# Patient Record
Sex: MALE | Race: WHITE | NOT HISPANIC OR LATINO
[De-identification: names, ages, dates, MRNs, and addresses within clinical notes are randomized per-mention and may not be internally consistent; named-entity substitution may affect disease eponyms.]

---

## 2019-03-06 ENCOUNTER — APPOINTMENT (OUTPATIENT)
Dept: FAMILY MEDICINE | Facility: CLINIC | Age: 54
End: 2019-03-06

## 2019-10-11 ENCOUNTER — EMERGENCY (EMERGENCY)
Facility: HOSPITAL | Age: 54
LOS: 1 days | Discharge: HOME | End: 2019-10-11
Attending: INTERNAL MEDICINE | Admitting: INTERNAL MEDICINE
Payer: COMMERCIAL

## 2019-10-11 VITALS
SYSTOLIC BLOOD PRESSURE: 147 MMHG | HEART RATE: 67 BPM | RESPIRATION RATE: 15 BRPM | DIASTOLIC BLOOD PRESSURE: 89 MMHG | WEIGHT: 195.11 LBS | OXYGEN SATURATION: 97 % | TEMPERATURE: 98 F | HEIGHT: 70 IN

## 2019-10-11 DIAGNOSIS — R73.9 HYPERGLYCEMIA, UNSPECIFIED: ICD-10-CM

## 2019-10-11 LAB
ACETONE SERPL-MCNC: NEGATIVE — SIGNIFICANT CHANGE UP
ALBUMIN SERPL ELPH-MCNC: 3.9 G/DL — SIGNIFICANT CHANGE UP (ref 3.3–5)
ALP SERPL-CCNC: 89 U/L — SIGNIFICANT CHANGE UP (ref 40–120)
ALT FLD-CCNC: 23 U/L — SIGNIFICANT CHANGE UP (ref 10–45)
ANION GAP SERPL CALC-SCNC: 5 MMOL/L — SIGNIFICANT CHANGE UP (ref 5–17)
AST SERPL-CCNC: 19 U/L — SIGNIFICANT CHANGE UP (ref 10–40)
BASOPHILS # BLD AUTO: 0.05 K/UL — SIGNIFICANT CHANGE UP (ref 0–0.2)
BASOPHILS NFR BLD AUTO: 0.9 % — SIGNIFICANT CHANGE UP (ref 0–2)
BILIRUB SERPL-MCNC: 0.5 MG/DL — SIGNIFICANT CHANGE UP (ref 0.2–1.2)
BUN SERPL-MCNC: 18 MG/DL — SIGNIFICANT CHANGE UP (ref 7–23)
CALCIUM SERPL-MCNC: 9.4 MG/DL — SIGNIFICANT CHANGE UP (ref 8.4–10.5)
CHLORIDE SERPL-SCNC: 102 MMOL/L — SIGNIFICANT CHANGE UP (ref 96–108)
CO2 SERPL-SCNC: 29 MMOL/L — SIGNIFICANT CHANGE UP (ref 22–31)
CREAT SERPL-MCNC: 1.06 MG/DL — SIGNIFICANT CHANGE UP (ref 0.5–1.3)
EOSINOPHIL # BLD AUTO: 0.06 K/UL — SIGNIFICANT CHANGE UP (ref 0–0.5)
EOSINOPHIL NFR BLD AUTO: 1.1 % — SIGNIFICANT CHANGE UP (ref 0–6)
GLUCOSE BLDC GLUCOMTR-MCNC: 199 MG/DL — HIGH (ref 70–99)
GLUCOSE BLDC GLUCOMTR-MCNC: 268 MG/DL — HIGH (ref 70–99)
GLUCOSE SERPL-MCNC: 307 MG/DL — HIGH (ref 70–99)
HCT VFR BLD CALC: 46.9 % — SIGNIFICANT CHANGE UP (ref 39–50)
HGB BLD-MCNC: 16.2 G/DL — SIGNIFICANT CHANGE UP (ref 13–17)
IMM GRANULOCYTES NFR BLD AUTO: 0.5 % — SIGNIFICANT CHANGE UP (ref 0–1.5)
LIDOCAIN IGE QN: 163 U/L — SIGNIFICANT CHANGE UP (ref 73–393)
LYMPHOCYTES # BLD AUTO: 1.68 K/UL — SIGNIFICANT CHANGE UP (ref 1–3.3)
LYMPHOCYTES # BLD AUTO: 30.8 % — SIGNIFICANT CHANGE UP (ref 13–44)
MCHC RBC-ENTMCNC: 30.1 PG — SIGNIFICANT CHANGE UP (ref 27–34)
MCHC RBC-ENTMCNC: 34.5 GM/DL — SIGNIFICANT CHANGE UP (ref 32–36)
MCV RBC AUTO: 87 FL — SIGNIFICANT CHANGE UP (ref 80–100)
MONOCYTES # BLD AUTO: 0.52 K/UL — SIGNIFICANT CHANGE UP (ref 0–0.9)
MONOCYTES NFR BLD AUTO: 9.5 % — SIGNIFICANT CHANGE UP (ref 2–14)
NEUTROPHILS # BLD AUTO: 3.12 K/UL — SIGNIFICANT CHANGE UP (ref 1.8–7.4)
NEUTROPHILS NFR BLD AUTO: 57.2 % — SIGNIFICANT CHANGE UP (ref 43–77)
NRBC # BLD: 0 /100 WBCS — SIGNIFICANT CHANGE UP (ref 0–0)
PLATELET # BLD AUTO: 250 K/UL — SIGNIFICANT CHANGE UP (ref 150–400)
POTASSIUM SERPL-MCNC: 4.6 MMOL/L — SIGNIFICANT CHANGE UP (ref 3.5–5.3)
POTASSIUM SERPL-SCNC: 4.6 MMOL/L — SIGNIFICANT CHANGE UP (ref 3.5–5.3)
PROT SERPL-MCNC: 7.5 G/DL — SIGNIFICANT CHANGE UP (ref 6–8.3)
RBC # BLD: 5.39 M/UL — SIGNIFICANT CHANGE UP (ref 4.2–5.8)
RBC # FLD: 12.1 % — SIGNIFICANT CHANGE UP (ref 10.3–14.5)
SODIUM SERPL-SCNC: 136 MMOL/L — SIGNIFICANT CHANGE UP (ref 135–145)
WBC # BLD: 5.46 K/UL — SIGNIFICANT CHANGE UP (ref 3.8–10.5)
WBC # FLD AUTO: 5.46 K/UL — SIGNIFICANT CHANGE UP (ref 3.8–10.5)

## 2019-10-11 PROCEDURE — 96360 HYDRATION IV INFUSION INIT: CPT

## 2019-10-11 PROCEDURE — 93010 ELECTROCARDIOGRAM REPORT: CPT

## 2019-10-11 PROCEDURE — 82962 GLUCOSE BLOOD TEST: CPT

## 2019-10-11 PROCEDURE — 96361 HYDRATE IV INFUSION ADD-ON: CPT

## 2019-10-11 PROCEDURE — 82009 KETONE BODYS QUAL: CPT

## 2019-10-11 PROCEDURE — 83690 ASSAY OF LIPASE: CPT

## 2019-10-11 PROCEDURE — 99284 EMERGENCY DEPT VISIT MOD MDM: CPT | Mod: 25

## 2019-10-11 PROCEDURE — 80053 COMPREHEN METABOLIC PANEL: CPT

## 2019-10-11 PROCEDURE — 99284 EMERGENCY DEPT VISIT MOD MDM: CPT

## 2019-10-11 PROCEDURE — 85027 COMPLETE CBC AUTOMATED: CPT

## 2019-10-11 PROCEDURE — 93005 ELECTROCARDIOGRAM TRACING: CPT

## 2019-10-11 RX ORDER — SODIUM CHLORIDE 9 MG/ML
2700 INJECTION INTRAMUSCULAR; INTRAVENOUS; SUBCUTANEOUS ONCE
Refills: 0 | Status: COMPLETED | OUTPATIENT
Start: 2019-10-11 | End: 2019-10-11

## 2019-10-11 RX ORDER — LEVOTHYROXINE SODIUM 125 MCG
1 TABLET ORAL
Qty: 0 | Refills: 0 | DISCHARGE

## 2019-10-11 RX ORDER — FEXOFENADINE HCL 30 MG
1 TABLET ORAL
Qty: 0 | Refills: 0 | DISCHARGE

## 2019-10-11 RX ORDER — BUPROPION HYDROCHLORIDE 150 MG/1
0 TABLET, EXTENDED RELEASE ORAL
Qty: 0 | Refills: 0 | DISCHARGE

## 2019-10-11 RX ORDER — METFORMIN HYDROCHLORIDE 850 MG/1
500 TABLET ORAL ONCE
Refills: 0 | Status: COMPLETED | OUTPATIENT
Start: 2019-10-11 | End: 2019-10-11

## 2019-10-11 RX ORDER — METFORMIN HYDROCHLORIDE 850 MG/1
1 TABLET ORAL
Qty: 60 | Refills: 0
Start: 2019-10-11 | End: 2019-11-09

## 2019-10-11 RX ADMIN — SODIUM CHLORIDE 2700 MILLILITER(S): 9 INJECTION INTRAMUSCULAR; INTRAVENOUS; SUBCUTANEOUS at 11:21

## 2019-10-11 RX ADMIN — METFORMIN HYDROCHLORIDE 500 MILLIGRAM(S): 850 TABLET ORAL at 13:06

## 2019-10-11 RX ADMIN — SODIUM CHLORIDE 2700 MILLILITER(S): 9 INJECTION INTRAMUSCULAR; INTRAVENOUS; SUBCUTANEOUS at 13:00

## 2019-10-11 NOTE — ED ADULT NURSE NOTE - OBJECTIVE STATEMENT
Pt states he was diagnosed as pre-diabetic and last A1C was 6.7 in July. Pt does not take oral diabetic medications. Pt states that 2 weeks ago he had frequent urination, and blurred vision. Pt normally does not check blood glucose but checked this AM and states his sugar was over 300. Pt denies pain.

## 2019-10-11 NOTE — ED PROVIDER NOTE - NSFOLLOWUPINSTRUCTIONS_ED_ALL_ED_FT
Rest, drink plenty of fluids  Advance activity as tolerated  Continue all previously prescribed medications as directed  Follow up with your PMD 2-3 days- bring copies of your results  Return to the ER for worsening  Type 2 Diabetes Mellitus, Self Care, Adult  When you have type 2 diabetes (type 2 diabetes mellitus), you must make sure your blood sugar (glucose) stays in a healthy range. You can do this with:  Nutrition.Exercise.Lifestyle changes.Medicines or insulin, if needed.Support from your doctors and others.How to stay aware of blood sugar  Image   Check your blood sugar level every day, as often as told.Have your A1c (hemoglobin A1c) level checked two or more times a year. Have it checked more often if your doctor tells you to.Your doctor will set personal treatment goals for you. Generally, you should have these blood sugar levels:  Before meals (preprandial): 80–130 mg/dL (4.4–7.2 mmol/L).After meals (postprandial): below 180 mg/dL (10 mmol/L).A1c level: less than 7%.How to manage high and low blood sugar  Signs of high blood sugar     High blood sugar is called hyperglycemia. Know the signs of high blood sugar. Signs may include:  Feeling:  Thirsty.Hungry.Very tired.Needing to pee (urinate) more than usual.Blurry vision.Signs of low blood sugar    Low blood sugar is called hypoglycemia. This is when blood sugar is at or below 70 mg/dL (3.9 mmol/L). Signs may include:  Feeling:  Hungry.Worried or nervous (anxious).Sweaty and clammy.Confused.Dizzy.Sleepy.Sick to your stomach (nauseous).Having:  A fast heartbeat.A headache.A change in your vision.Jerky movements that you cannot control (seizure).Tingling or no feeling (numbness) around your mouth, lips, or tongue.Having trouble with:  Moving (coordination).Sleeping.Passing out (fainting).Getting upset easily (irritability).Treating low blood sugar    To treat low blood sugar, eat or drink something sugary right away. If you can think clearly and swallow safely, follow the 15:15 rule:  Take 15 grams of a fast-acting carb (carbohydrate). Talk with your doctor about how much you should take.Some fast-acting carbs are:  Sugar tablets (glucose pills). Take 3–4 pills.6–8 pieces of hard candy.4–6 oz (120–150 mL) of fruit juice.4–6 oz (120–150 mL) of regular (not diet) soda.1 Tbsp (15 mL) honey or sugar.Check your blood sugar 15 minutes after you take the carb.If your blood sugar is still at or below 70 mg/dL (3.9 mmol/L), take 15 grams of a carb again.If your blood sugar does not go above 70 mg/dL (3.9 mmol/L) after 3 tries, get help right away.After your blood sugar goes back to normal, eat a meal or a snack within 1 hour.Treating very low blood sugar    If your blood sugar is at or below 54 mg/dL (3 mmol/L), you have very low blood sugar (severe hypoglycemia). This is an emergency. Do not wait to see if the symptoms will go away. Get medical help right away. Call your local emergency services (911 in the [place]  If you have very low blood sugar and you cannot eat or drink, you may need a glucagon shot (injection). A family member or friend should learn how to check your blood sugar and how to give you a glucagon shot. Ask your doctor if you need to have a glucagon shot kit at home.  Follow these instructions at home:  Medicine     Take insulin and diabetes medicines as told.If your doctor says you should take more or less insulin and medicines, do this exactly as told.Do not run out of insulin or medicines.Having diabetes can raise your risk for other long-term conditions. These include heart disease and kidney disease. Your doctor may prescribe medicines to help you not have these problems.  Food     Image   Make healthy food choices. These include:  Chicken, fish, egg whites, and beans.Oats, whole wheat, bulgur, brown rice, quinoa, and millet.Fresh fruits and vegetables.Low-fat dairy products.Nuts, avocado, olive oil, and canola oil.Meet with a  (dietitian). He or she can help you make an eating plan that is right for you.Follow instructions from your doctor about what you cannot eat or drink.Drink enough fluid to keep your pee (urine) pale yellow.Keep track of carbs that you eat. Do this by reading food labels and learning food serving sizes.Follow your sick day plan when you cannot eat or drink normally. Make this plan with your doctor so it is ready to use.Activity     Exercise 3 or more times a week.Do not go more than 2 days without exercising.Talk with your doctor before you start a new exercise. Your doctor may need to tell you to change:  How much insulin or medicines you take.How much food you eat.Lifestyle     Do not use any tobacco products. These include cigarettes, chewing tobacco, and e-cigarettes. If you need help quitting, ask your doctor.Ask your doctor how much alcohol is safe for you.Learn to deal with stress. If you need help with this, ask your doctor.Body care     Image   Stay up to date with your shots (immunizations).Have your eyes and feet checked by a doctor as often as told.Check your skin and feet every day. Check for cuts, bruises, redness, blisters, or sores.Brush your teeth and gums two times a day. Floss one or more times a day.Go to the dentist one or more times every 6 months.Stay at a healthy weight.General instructions     Take over-the-counter and prescription medicines only as told by your doctor.Share your diabetes care plan with:  Your work or school.People you live with.Carry a card or wear jewelry that says you have diabetes.Keep all follow-up visits as told by your doctor. This is important.Questions to ask your doctor  Do I need to meet with a diabetes educator?Where can I find a support group for people with diabetes?Where to find more information  To learn more about diabetes, visit:  American Diabetes Association: www.diabetes.orgAmerican Association of Diabetes Educators: www.diabeteseducator.orgSuUniversity Hospitals Health Systemry  When you have type 2 diabetes, you must make sure your blood sugar (glucose) stays in a healthy range.Check your blood sugar every day, as often as told.Having diabetes can raise your risk for other conditions. Your doctor may prescribe medicines to help you not have these problems.Keep all follow-up visits as told by your doctor. This is important.This information is not intended to replace advice given to you by your health care provider. Make sure you discuss any questions you have with your health care provider.    Document Released: 04/10/2017 Document Revised: 06/10/2019 Document Reviewed: 01/20/2017  [employer] Interactive Patient Education © 2019 [employer]

## 2019-10-11 NOTE — ED PROVIDER NOTE - CARE PROVIDER_API CALL
Agustín Herrera)  Internal Medicine; Pulmonary Disease  [address] [place] [address]  Phone: [phone]  Fax: [phone]  Follow Up Time:

## 2019-10-11 NOTE — ED PROVIDER NOTE - CHPI ED SYMPTOMS NEG
no decreased eating/drinking/no pain/no numbness/no vomiting/no weakness/no tingling/no fever/no nausea/no dizziness/no chills

## 2019-10-11 NOTE — ED PROVIDER NOTE - PATIENT PORTAL LINK FT
You can access the FollowMyHealth Patient Portal offered by [facility] by registering at the following website: [email or web address]. By joining [employer]’s FollowMyHealth portal, you will also be able to view your health information using other applications (apps) compatible with our system.

## 2019-10-11 NOTE — ED ADULT NURSE NOTE - CHPI ED NUR SYMPTOMS NEG
no chills/no nausea/no fever/no pain/no decreased eating/drinking/no vomiting/no weakness/no dizziness/no tingling

## 2020-10-05 NOTE — ED PROVIDER NOTE - MUSCULOSKELETAL, MLM
Offered and patient declined Spine appears normal, range of motion is not limited, no muscle or joint tenderness

## 2021-07-05 ENCOUNTER — EMERGENCY (EMERGENCY)
Facility: HOSPITAL | Age: 56
LOS: 1 days | Discharge: HOME | End: 2021-07-05
Attending: EMERGENCY MEDICINE | Admitting: EMERGENCY MEDICINE
Payer: COMMERCIAL

## 2021-07-05 VITALS
DIASTOLIC BLOOD PRESSURE: 88 MMHG | WEIGHT: 190.04 LBS | TEMPERATURE: 97 F | OXYGEN SATURATION: 98 % | HEART RATE: 72 BPM | HEIGHT: 70 IN | SYSTOLIC BLOOD PRESSURE: 158 MMHG | RESPIRATION RATE: 16 BRPM

## 2021-07-05 PROCEDURE — 96365 THER/PROPH/DIAG IV INF INIT: CPT

## 2021-07-05 PROCEDURE — 99285 EMERGENCY DEPT VISIT HI MDM: CPT

## 2021-07-05 PROCEDURE — 96375 TX/PRO/DX INJ NEW DRUG ADDON: CPT

## 2021-07-05 PROCEDURE — 96372 THER/PROPH/DIAG INJ SC/IM: CPT | Mod: XU

## 2021-07-05 PROCEDURE — 99284 EMERGENCY DEPT VISIT MOD MDM: CPT | Mod: 25

## 2021-07-05 PROCEDURE — 96361 HYDRATE IV INFUSION ADD-ON: CPT

## 2021-07-05 RX ORDER — DIPHENHYDRAMINE HCL 50 MG
25 CAPSULE ORAL ONCE
Refills: 0 | Status: COMPLETED | OUTPATIENT
Start: 2021-07-05 | End: 2021-07-05

## 2021-07-05 RX ORDER — FAMOTIDINE 10 MG/ML
20 INJECTION INTRAVENOUS ONCE
Refills: 0 | Status: COMPLETED | OUTPATIENT
Start: 2021-07-05 | End: 2021-07-05

## 2021-07-05 RX ORDER — SODIUM CHLORIDE 9 MG/ML
1000 INJECTION INTRAMUSCULAR; INTRAVENOUS; SUBCUTANEOUS ONCE
Refills: 0 | Status: COMPLETED | OUTPATIENT
Start: 2021-07-05 | End: 2021-07-05

## 2021-07-05 RX ORDER — EPINEPHRINE 0.3 MG/.3ML
0.3 INJECTION INTRAMUSCULAR; SUBCUTANEOUS ONCE
Refills: 0 | Status: COMPLETED | OUTPATIENT
Start: 2021-07-05 | End: 2021-07-05

## 2021-07-05 RX ADMIN — FAMOTIDINE 20 MILLIGRAM(S): 10 INJECTION INTRAVENOUS at 22:08

## 2021-07-05 RX ADMIN — Medication 25 MILLIGRAM(S): at 21:36

## 2021-07-05 RX ADMIN — SODIUM CHLORIDE 1000 MILLILITER(S): 9 INJECTION INTRAMUSCULAR; INTRAVENOUS; SUBCUTANEOUS at 23:08

## 2021-07-05 RX ADMIN — FAMOTIDINE 100 MILLIGRAM(S): 10 INJECTION INTRAVENOUS at 21:37

## 2021-07-05 RX ADMIN — Medication 125 MILLIGRAM(S): at 21:37

## 2021-07-05 RX ADMIN — EPINEPHRINE 0.3 MILLIGRAM(S): 0.3 INJECTION INTRAMUSCULAR; SUBCUTANEOUS at 21:30

## 2021-07-05 RX ADMIN — SODIUM CHLORIDE 1000 MILLILITER(S): 9 INJECTION INTRAMUSCULAR; INTRAVENOUS; SUBCUTANEOUS at 21:58

## 2021-07-05 NOTE — ED PROVIDER NOTE - NSFOLLOWUPINSTRUCTIONS_ED_ALL_ED_FT
-take benadryl 25-50 mg every 6 hrs as needed for allergic reaction/ itching/ hives  -take prednisone and pepcid daily as directed  -use epipen injection for severe allergic reaction  -follow up with your primary doctor and allergist    Anaphylaxis    WHAT YOU NEED TO KNOW:    Anaphylaxis is a life-threatening allergic reaction that must be treated immediately. Your risk for anaphylaxis increases if you have asthma that is severe or not controlled. Medical conditions such as heart disease can also increase your risk. It is important to be prepared if you are at risk for anaphylaxis. Your symptoms can be worse each time you are exposed to the trigger.     DISCHARGE INSTRUCTIONS:    Steps to take for signs or symptoms of anaphylaxis:     Immediately give 1 shot of epinephrine only into the outer thigh muscle. Even if your allergic reaction seems mild, it can quickly become anaphylaxis. This may happen even if you had a mild reaction to the allergen in the past. Each exposure can cause a different reaction. Watch for signs and symptoms of anaphylaxis every time you are exposed to a trigger. Be ready to give a shot of epinephrine. It is okay to inject epinephrine through clothing. Just be careful to avoid seams, zippers, or other parts that can prevent the needle from entering your skin.How to Give An Epinephrine Shot Adult           Leave the shot in place as directed. Your healthcare provider may recommend you leave it in place for up to 10 seconds before you remove it. This helps make sure all of the epinephrine is delivered.       Call 911 and go to the emergency department, even if the shot improved symptoms. Do not drive yourself. Bring the used epinephrine shot with you.     Call 911 for any of the following:     You have a skin rash, hives, swelling, or itching.       You have trouble breathing, shortness of breath, wheezing, or coughing.      Your throat tightens or your lips or tongue swell.      You have difficulty swallowing or speaking.      You are dizzy, lightheaded, confused, or feel like you are going to faint.      You have nausea, diarrhea, or abdominal cramps, or you are vomiting.    Return to the emergency department if:     Signs or symptoms of anaphylaxis return.         Contact your healthcare provider if:     You have questions or concerns about your condition or care.        Medicines:     Epinephrine is medicine used to treat severe allergic reactions such as anaphylaxis. It is given as a shot into the outer thigh muscle.      Medicines such as antihistamines, steroids, and bronchodilators decrease inflammation, open airways, and make breathing easier.      Take your medicine as directed. Contact your healthcare provider if you think your medicine is not helping or if you have side effects. Tell him or her if you are allergic to any medicine. Keep a list of the medicines, vitamins, and herbs you take. Include the amounts, and when and why you take them. Bring the list or the pill bottles to follow-up visits. Carry your medicine list with you in case of an emergency.    Follow up with your healthcare provider as directed: Allergy testing may reveal allergies that can trigger anaphylaxis. Write down your questions so you remember to ask them during your visits.     Safety precautions:     Keep 2 shots of epinephrine with you at all times. You may need a second shot, because epinephrine only works for about 20 minutes and symptoms may return. Your healthcare provider can show you and family members how to give the shot. Check the expiration date every month and replace it before it expires.      Create an action plan. Your healthcare provider can help you create a written plan that explains the allergy and an emergency plan to treat a reaction. The plan explains when to give a second epinephrine shot if symptoms return or do not improve after the first. Give copies of the action plan and emergency instructions to family members, and work or school staff. Show them how to give a shot of epinephrine in case you are not able to give it to yourself.      Be careful when you exercise. If you have had exercise-induced anaphylaxis, do not exercise right after you eat. Stop exercising right away if you start to develop any signs or symptoms of anaphylaxis. You may first feel tired, warm, or have itchy skin. Hives, swelling, and severe breathing problems may develop if you continue to exercise.      Carry medical alert identification. Wear medical alert jewelry or carry a card that explains the allergy. Ask your healthcare provider where to get these items. Medical Alert Jewelry           Identify and avoid known triggers. Read food labels for ingredients. Look for triggers in your environment.      Ask about treatments to prevent anaphylaxis. You may need allergy shots or other medicines to treat allergies.

## 2021-07-05 NOTE — ED PROVIDER NOTE - PATIENT PORTAL LINK FT
You can access the FollowMyHealth Patient Portal offered by [facility] by registering at the following website: [email or web address]. By joining [employer]’s FollowMyHealth portal, you will also be able to view your health information using other applications (apps) compatible with our system.

## 2021-07-05 NOTE — ED ADULT NURSE NOTE - OBJECTIVE STATEMENT
pt a&ox3 ambulatory to ED c/o allergic reaction; states he was eating chick pea chips. pt states he felt throat tightening, tongue swelling and hoarse throat. O2 sat WNL, airway patent, respirations nonlabored. took 50mg benadryl PTA.

## 2021-07-05 NOTE — ED PROVIDER NOTE - CROS ED SKIN ALL NEG
Patient c/o penile pain upon arrival. Bernal catheter in place draining concentrated urine. Denies nausea, vomiting, fevers at this time. Abdomen soft on palpation, denies suprapubic pressure. negative...

## 2021-07-05 NOTE — ED PROVIDER NOTE - NSFOLLOWUPCLINICS_GEN_ALL_ED_FT
[facility] Allergy and Immunology  Allergy  [address] [place] [address]  Phone: [phone]  Fax:   Follow Up Time: Routine

## 2021-07-05 NOTE — ED PROVIDER NOTE - OBJECTIVE STATEMENT
55 y/o m with h/o DM (metformin) pw sudden onset throat tightening, tongue swelling and hoarseness ot his voice s/p eating hummus and chips just PTA. No known allergies to food. No prior episodes. Denies chest pain, shortness of breath, wheezing, abdominal pain, n/v/d, rash. Alert, speaking in full sentences, breathing not labored, hoarseness noted to voice. Took Benadryl 25mg PTA.  NKDA  (+) smoker

## 2021-07-05 NOTE — ED PROVIDER NOTE - ATTENDING CONTRIBUTION TO CARE
pt c/o allergic reaction after eating chickpeas, cherries, guacamole tonight. no known food allergies. sxs started about 815pm. had hoarseness in voice, feeling of something in throat and swelling at tip of tongue.  no sob. +cough. no n/v/d . no abd pain. no rash/hives. took 50mg benadryl after sxs started    exam:   General: well appearing, NAD.   HEENT: eyes perrl, nose normal, OP no erythema/exudate/swelling.  slight swelling tip of tongue. +hoarseness of voice.  no stridor  cor: RRR, s1s2, 2+rad pulses.   lungs: ctabl, no resp distress. no wheeze  abd: soft, ntnd.   neuro: a&ox3, cn2-12 intact, GONZALES, 5/5 strength c nl sensation all extremities, nl coordination.   MSK: no extremity swelling.  Skin: normal, no rash    AP: acute onset voice change, cough, tongue swelling after eating. most likely allergic reaction to food. concern for airway involvement. will treat c benadryl, solumedrol, pepcid, epipen.. close observation

## 2021-07-05 NOTE — ED ADULT NURSE NOTE - NSIMPLEMENTINTERV_GEN_ALL_ED
Problem: Adult Inpatient Plan of Care  Goal: Plan of Care Review  Outcome: Ongoing (interventions implemented as appropriate)  Pt is free from falls trauma and injuries. Bed in low position with call light in reach. Skin is warm and dry. VS are stable. No noted fever or pain. Lasix held this shift due to low BP.       Implemented All Universal Safety Interventions:  [place] to call system. Call bell, personal items and telephone within reach. Instruct patient to call for assistance. Room bathroom lighting operational. Non-slip footwear when patient is off stretcher. Physically safe environment: no spills, clutter or unnecessary equipment. Stretcher in lowest position, wheels locked, appropriate side rails in place.

## 2021-07-05 NOTE — ED PROVIDER NOTE - CLINICAL SUMMARY MEDICAL DECISION MAKING FREE TEXT BOX
55 y/o m with h/o DM (metformin) pw sudden onset throat tightening, tongue swelling and hoarseness ot his voice s/p eating hummus and chips just PTA. No known allergies to food. No prior episodes. Denies chest pain, shortness of breath, wheezing, abdominal pain, n/v/d, rash. Alert, speaking in full sentences, breathing not labored, hoarseness noted to voice. Took Benadryl 25mg PTA.  NKDA. (+) smoker  Plan: Anaphylaxis reaction- gave solumedrol, benadryl, pepcid and epi upon arrival. Will observe on cardiac monitor and reassess 57 y/o m with h/o DM (metformin) pw sudden onset throat tightening, tongue swelling and hoarseness ot his voice s/p eating hummus and chips just PTA. No known allergies to food. No prior episodes. Denies chest pain, shortness of breath, wheezing, abdominal pain, n/v/d, rash. Alert, speaking in full sentences, breathing not labored, hoarseness noted to voice. Took Benadryl 25mg PTA.  NKDA. (+) smoker  Plan: Anaphylaxis reaction- gave solumedrol, benadryl, pepcid and epi upon arrival. Will observe on cardiac monitor and reassess      00:00 dr hughes: pt with complete improvement. voice normal now, clear.no hoarseness. no tongue swelling. no sxs. tongue normal, no swelling. no oropharyngeal or tongue swelling. told pt to return for worsening 55 y/o m with h/o DM (metformin) pw sudden onset throat tightening, tongue swelling and hoarseness ot his voice s/p eating hummus and chips just PTA. No known allergies to food. No prior episodes. Denies chest pain, shortness of breath, wheezing, abdominal pain, n/v/d, rash. Alert, speaking in full sentences, breathing not labored, hoarseness noted to voice. Took Benadryl 25mg PTA.  NKDA. (+) smoker  Plan: Anaphylaxis reaction- gave solumedrol, benadryl, pepcid and epi upon arrival. Will observe on cardiac monitor and reassess      00:00 dr hughes: pt with complete improvement. voice normal now, clear.no hoarseness. no tongue swelling. no sxs. tongue normal, no swelling. no oropharyngeal or tongue swelling. explained to pt possibility of rebound reaction after meds/epi wears off.  told pt to return for worsening

## 2021-07-06 VITALS
RESPIRATION RATE: 18 BRPM | SYSTOLIC BLOOD PRESSURE: 119 MMHG | DIASTOLIC BLOOD PRESSURE: 77 MMHG | TEMPERATURE: 98 F | OXYGEN SATURATION: 97 % | HEART RATE: 76 BPM

## 2021-07-06 PROBLEM — E03.9 HYPOTHYROIDISM, UNSPECIFIED: Chronic | Status: ACTIVE | Noted: 2019-10-11

## 2021-07-06 PROBLEM — R73.03 PREDIABETES: Chronic | Status: ACTIVE | Noted: 2019-10-24

## 2021-07-06 RX ORDER — EPINEPHRINE 0.3 MG/.3ML
0.3 INJECTION INTRAMUSCULAR; SUBCUTANEOUS
Qty: 1 | Refills: 0
Start: 2021-07-06 | End: 2021-07-06

## 2021-07-06 RX ORDER — DIPHENHYDRAMINE HCL 50 MG
1 CAPSULE ORAL
Qty: 20 | Refills: 0
Start: 2021-07-06

## 2021-07-06 RX ORDER — FAMOTIDINE 10 MG/ML
1 INJECTION INTRAVENOUS
Qty: 7 | Refills: 0
Start: 2021-07-06 | End: 2021-07-12

## 2021-08-08 ENCOUNTER — TRANSCRIPTION ENCOUNTER (OUTPATIENT)
Age: 56
End: 2021-08-08

## 2021-11-30 ENCOUNTER — TRANSCRIPTION ENCOUNTER (OUTPATIENT)
Age: 56
End: 2021-11-30

## 2022-06-25 ENCOUNTER — NON-APPOINTMENT (OUTPATIENT)
Age: 57
End: 2022-06-25

## 2023-02-28 ENCOUNTER — NON-APPOINTMENT (OUTPATIENT)
Age: 58
End: 2023-02-28

## 2023-07-26 NOTE — ED ADULT TRIAGE NOTE - STATUS:
Neurology Progress Note    SUBJECTIVE/OBJECTIVE/INTERVAL EVENTS: Patient seen and examined at bedside w/ neuro attending and team.     INTERVAL HISTORY:    Patient seen with family at bedside. Per daughter, speech is improving however patient is still having word finding difficulty and substituting words but does not appear to have problems with comprehension.  Per NSICU team, aphasia worsens with lower BP, she is currently on IV phenylephrine for goal -200.     REVIEW OF SYSTEMS: Otherwise denies fever, chills, headaches, vision changes, blurry vision, double vision, nausea, vomiting, hearing change, focal weakness, focal numbness, parasthesias, bowel/ bladder incontinence.  Few questions of a 10-system ROS was performed and is negative except for those items noted above and/or in the HPI.    VITALS & EXAMINATION:  Vital Signs Last 24 Hrs  T(C): 37.2 (26 Jul 2023 11:00), Max: 37.2 (26 Jul 2023 11:00)  T(F): 98.9 (26 Jul 2023 11:00), Max: 98.9 (26 Jul 2023 11:00)  HR: 90 (26 Jul 2023 13:45) (83 - 99)  BP: 167/73 (25 Jul 2023 22:00) (167/73 - 191/85)  BP(mean): 100 (25 Jul 2023 22:00) (100 - 117)  RR: 16 (26 Jul 2023 13:45) (12 - 23)  SpO2: 100% (26 Jul 2023 13:45) (91% - 100%)    Parameters below as of 26 Jul 2023 07:00  Patient On (Oxygen Delivery Method): room air    General:  Constitutional: Female, appears stated age, nontoxic, not in distress,    Head: Normocephalic;   Eyes: clear sclera;   Extremities: No cyanosis;   Resp: breathing comfortably     Neurological (>12):  MS: Awake, alert.  Oriented person place situation. Follows commands. Attends to examiner  Language: Speech is hypophonic, clear, fluent, good repetition,  comprehension, registration of words.  CNs: PERRL (R 3mm, L 3mm). VFF. EOMI. No disconjugate gaze, skew. V1-3 intact LT, No facial asymmetry b/l. Hearing grossly normal b/l. Tongue midline.     Motor - Normal bulk and tone throughout. No pronator drift.    L/R (out of 5)       Deltoid  5/5    Biceps   5/5      Triceps  5/5         Wrist Extension 5/5   Wrist Flexion  5/5   Interossei 5/5     5/5   L/R (out of 5)       Hip Flexion  5/5    Hip Extension  5/5  Knee Extension  5/5  Dorsiflexion  5/5      Plantar Flexion 5/5     Sensation: Intact to LT b/l. Cortical: Extinction on DSS (neglect): none  Reflexes L/R:  Biceps(C5) 2/2  BR(C6) 2/2   Triceps(C7)  2/2 Patellar(L4)   2/2   Toes: mute  Coordination: No dysmetria to FTN b/l UE  Gait: Normal Romberg. No postural instability. Normal stance.    LABORATORY:  CBC                       10.4   9.57  )-----------( 265      ( 26 Jul 2023 05:26 )             33.0     Chem 07-26    138  |  103  |  23  ----------------------------<  113<H>  4.0   |  20<L>  |  0.81    Ca    9.1      26 Jul 2023 05:26  Phos  3.7     07-26  Mg     2.2     07-26      LFTs   Coagulopathy   Lipid Panel 07-23 Chol 142 LDL -- HDL 65 Trig 96  A1c   Cardiac enzymes     U/A Urinalysis Basic - ( 26 Jul 2023 05:26 )    Color: x / Appearance: x / SG: x / pH: x  Gluc: 113 mg/dL / Ketone: x  / Bili: x / Urobili: x   Blood: x / Protein: x / Nitrite: x   Leuk Esterase: x / RBC: x / WBC x   Sq Epi: x / Non Sq Epi: x / Bacteria: x      CSF  Immunological  Other    STUDIES & IMAGING: (EEG, CT, MR, U/S, TTE/QUAN):   Neurology Progress Note    SUBJECTIVE/OBJECTIVE/INTERVAL EVENTS: Patient seen and examined at bedside w/ neuro attending and team.     INTERVAL HISTORY:    Patient seen with family at bedside. Per daughter, speech is improving however patient is still having word finding difficulty and substituting words but does not appear to have problems with comprehension.  Per NSICU team, aphasia worsens with lower BP, she is currently on IV phenylephrine for goal -200.     REVIEW OF SYSTEMS: Otherwise denies fever, chills, headaches, vision changes, blurry vision, double vision, nausea, vomiting, hearing change, focal weakness, focal numbness, parasthesias, bowel/ bladder incontinence.  Few questions of a 10-system ROS was performed and is negative except for those items noted above and/or in the HPI.    VITALS & EXAMINATION:  Vital Signs Last 24 Hrs  T(C): 37.2 (26 Jul 2023 11:00), Max: 37.2 (26 Jul 2023 11:00)  T(F): 98.9 (26 Jul 2023 11:00), Max: 98.9 (26 Jul 2023 11:00)  HR: 90 (26 Jul 2023 13:45) (83 - 99)  BP: 167/73 (25 Jul 2023 22:00) (167/73 - 191/85)  BP(mean): 100 (25 Jul 2023 22:00) (100 - 117)  RR: 16 (26 Jul 2023 13:45) (12 - 23)  SpO2: 100% (26 Jul 2023 13:45) (91% - 100%)    Parameters below as of 26 Jul 2023 07:00  Patient On (Oxygen Delivery Method): room air    General:  Constitutional: Female, appears stated age, nontoxic, not in distress,    Head: Normocephalic;   Eyes: clear sclera;   Extremities: No cyanosis;   Resp: breathing comfortably     Neurological (>12):  MS: Awake, alert.  Oriented person place time. Follows simple commands. Attends to examiner  Language: Speech is clear. Expressive aphasia noted, good repetition, comprehension, registration of words.  CNs: PERRL (R 3mm, L 3mm). VFF. EOMI. No disconjugate gaze, skew. V1-3 intact LT, No facial asymmetry b/l. Hearing grossly normal b/l. Tongue midline.     Motor - Normal bulk and tone throughout. No pronator drift.    L/R (out of 5)       Deltoid  5/5    Biceps   5/5      Triceps  5/5         Wrist Extension 5/5   Wrist Flexion  5/5   Interossei 5/5     5/5   L/R (out of 5)       Hip Flexion  5/5    Hip Extension  5/5  Knee Extension  5/5  Dorsiflexion  5/5      Plantar Flexion 5/5     Sensation: Intact to LT b/l. Cortical: Extinction on DSS (neglect): none  Reflexes L/R:  Biceps(C5) 2/2  BR(C6) 2/2   Triceps(C7)  2/2 Patellar(L4)   2/2   Toes: mute  Coordination: No dysmetria to FTN b/l UE  Gait: Normal Romberg. No postural instability. Normal stance.    LABORATORY:  CBC                       10.4   9.57  )-----------( 265      ( 26 Jul 2023 05:26 )             33.0     Chem 07-26    138  |  103  |  23  ----------------------------<  113<H>  4.0   |  20<L>  |  0.81    Ca    9.1      26 Jul 2023 05:26  Phos  3.7     07-26  Mg     2.2     07-26      LFTs   Coagulopathy   Lipid Panel 07-23 Chol 142 LDL -- HDL 65 Trig 96  A1c   Cardiac enzymes     U/A Urinalysis Basic - ( 26 Jul 2023 05:26 )    Color: x / Appearance: x / SG: x / pH: x  Gluc: 113 mg/dL / Ketone: x  / Bili: x / Urobili: x   Blood: x / Protein: x / Nitrite: x   Leuk Esterase: x / RBC: x / WBC x   Sq Epi: x / Non Sq Epi: x / Bacteria: x      CSF  Immunological  Other    STUDIES & IMAGING: (EEG, CT, MR, U/S, TTE/QUAN):   Neurology Progress Note    SUBJECTIVE/OBJECTIVE/INTERVAL EVENTS: Patient seen and examined at bedside w/ neuro attending and team.     INTERVAL HISTORY:    Patient seen with family at bedside. Per daughter, speech is improving however patient is still having word finding difficulty and substituting words but does not appear to have problems with comprehension.  Per NSICU team, aphasia worsens with lower BP, she is currently on IV phenylephrine for goal -200.     REVIEW OF SYSTEMS: Otherwise denies fever, chills, headaches, vision changes, blurry vision, double vision, nausea, vomiting, hearing change, focal weakness, focal numbness, parasthesias, bowel/ bladder incontinence.  Few questions of a 10-system ROS was performed and is negative except for those items noted above and/or in the HPI.    VITALS & EXAMINATION:  Vital Signs Last 24 Hrs  T(C): 37.2 (26 Jul 2023 11:00), Max: 37.2 (26 Jul 2023 11:00)  T(F): 98.9 (26 Jul 2023 11:00), Max: 98.9 (26 Jul 2023 11:00)  HR: 90 (26 Jul 2023 13:45) (83 - 99)  BP: 167/73 (25 Jul 2023 22:00) (167/73 - 191/85)  BP(mean): 100 (25 Jul 2023 22:00) (100 - 117)  RR: 16 (26 Jul 2023 13:45) (12 - 23)  SpO2: 100% (26 Jul 2023 13:45) (91% - 100%)    Parameters below as of 26 Jul 2023 07:00  Patient On (Oxygen Delivery Method): room air    General:  Constitutional: Female, appears stated age, nontoxic, not in distress,    Head: Normocephalic;   Eyes: clear sclera;   Extremities: No cyanosis;   Resp: breathing comfortably     Neurological (>12):  MS: Awake, alert.  Oriented person place time. Follows simple commands. Attends to examiner  Language: Speech is clear. Expressive aphasia, good repetition, comprehension, registration of words.  CNs: PERRL (R 3mm, L 3mm). VFF. EOMI. No disconjugate gaze, skew. V1-3 intact LT, No facial asymmetry b/l. Hearing grossly normal b/l. Tongue midline.     Motor - Normal bulk and tone throughout. No pronator drift.  RUE 4+/5  LUE 5/5  LLE 5/5  RLE 5/5    Sensation: Intact to LT b/l.   Reflexes L/R:  Not assessed  Coordination: No dysmetria to FTN b/l UE  Gait: Not assessed    LABORATORY:  CBC                       10.4   9.57  )-----------( 265      ( 26 Jul 2023 05:26 )             33.0     Chem 07-26    138  |  103  |  23  ----------------------------<  113<H>  4.0   |  20<L>  |  0.81    Ca    9.1      26 Jul 2023 05:26  Phos  3.7     07-26  Mg     2.2     07-26      LFTs   Coagulopathy   Lipid Panel 07-23 Chol 142 LDL -- HDL 65 Trig 96  A1c   Cardiac enzymes     U/A Urinalysis Basic - ( 26 Jul 2023 05:26 )    Color: x / Appearance: x / SG: x / pH: x  Gluc: 113 mg/dL / Ketone: x  / Bili: x / Urobili: x   Blood: x / Protein: x / Nitrite: x   Leuk Esterase: x / RBC: x / WBC x   Sq Epi: x / Non Sq Epi: x / Bacteria: x      CSF  Immunological  Other    STUDIES & IMAGING: (EEG, CT, MR, U/S, TTE/QUAN):   Applied

## 2023-08-24 ENCOUNTER — OFFICE (OUTPATIENT)
Dept: URBAN - METROPOLITAN AREA CLINIC 27 | Facility: CLINIC | Age: 58
Setting detail: OPHTHALMOLOGY
End: 2023-08-24
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
## 2023-08-24 PROCEDURE — NO SHOW FE NO SHOW FEE: Performed by: OPHTHALMOLOGY

## 2023-12-28 NOTE — ED PROVIDER NOTE - CONSTITUTIONAL NEGATIVE STATEMENT, MLM
[FreeTextEntry1] : CXR reveals a normal sized heart; no evidence of infiltrate or effusion--a normal appearing chest radiograph  no fever and no chills.

## 2024-02-12 ENCOUNTER — OUTPATIENT (OUTPATIENT)
Dept: OUTPATIENT SERVICES | Facility: HOSPITAL | Age: 59
LOS: 1 days | End: 2024-02-12
Payer: COMMERCIAL

## 2024-02-12 ENCOUNTER — APPOINTMENT (OUTPATIENT)
Dept: RADIOLOGY | Facility: HOSPITAL | Age: 59
End: 2024-02-12
Payer: COMMERCIAL

## 2024-02-12 DIAGNOSIS — Z00.8 ENCOUNTER FOR OTHER GENERAL EXAMINATION: ICD-10-CM

## 2024-02-12 PROCEDURE — 73630 X-RAY EXAM OF FOOT: CPT | Mod: 26,LT

## 2024-02-12 PROCEDURE — 73630 X-RAY EXAM OF FOOT: CPT

## 2024-03-18 ENCOUNTER — EMERGENCY (EMERGENCY)
Facility: HOSPITAL | Age: 59
LOS: 1 days | Discharge: HOME | End: 2024-03-18
Attending: EMERGENCY MEDICINE | Admitting: EMERGENCY MEDICINE
Payer: COMMERCIAL

## 2024-03-18 VITALS
TEMPERATURE: 98 F | WEIGHT: 175.05 LBS | RESPIRATION RATE: 18 BRPM | HEIGHT: 70 IN | OXYGEN SATURATION: 98 % | HEART RATE: 67 BPM | DIASTOLIC BLOOD PRESSURE: 101 MMHG | SYSTOLIC BLOOD PRESSURE: 166 MMHG

## 2024-03-18 VITALS
SYSTOLIC BLOOD PRESSURE: 136 MMHG | RESPIRATION RATE: 17 BRPM | DIASTOLIC BLOOD PRESSURE: 78 MMHG | OXYGEN SATURATION: 98 % | HEART RATE: 64 BPM

## 2024-03-18 VITALS
SYSTOLIC BLOOD PRESSURE: 157 MMHG | DIASTOLIC BLOOD PRESSURE: 95 MMHG | HEART RATE: 81 BPM | HEIGHT: 70 IN | TEMPERATURE: 98 F | WEIGHT: 160.06 LBS | RESPIRATION RATE: 18 BRPM | OXYGEN SATURATION: 98 %

## 2024-03-18 LAB
ALBUMIN SERPL ELPH-MCNC: 4.3 G/DL — SIGNIFICANT CHANGE UP (ref 3.3–5)
ALP SERPL-CCNC: 70 U/L — SIGNIFICANT CHANGE UP (ref 40–120)
ALT FLD-CCNC: 24 U/L — SIGNIFICANT CHANGE UP (ref 10–45)
ANION GAP SERPL CALC-SCNC: 6 MMOL/L — SIGNIFICANT CHANGE UP (ref 5–17)
AST SERPL-CCNC: 23 U/L — SIGNIFICANT CHANGE UP (ref 10–40)
BASOPHILS # BLD AUTO: 0.06 K/UL — SIGNIFICANT CHANGE UP (ref 0–0.2)
BASOPHILS NFR BLD AUTO: 1 % — SIGNIFICANT CHANGE UP (ref 0–2)
BILIRUB SERPL-MCNC: 0.8 MG/DL — SIGNIFICANT CHANGE UP (ref 0.2–1.2)
BUN SERPL-MCNC: 18 MG/DL — SIGNIFICANT CHANGE UP (ref 7–23)
CALCIUM SERPL-MCNC: 9.6 MG/DL — SIGNIFICANT CHANGE UP (ref 8.4–10.5)
CHLORIDE SERPL-SCNC: 101 MMOL/L — SIGNIFICANT CHANGE UP (ref 96–108)
CO2 SERPL-SCNC: 30 MMOL/L — SIGNIFICANT CHANGE UP (ref 22–31)
CREAT SERPL-MCNC: 1.13 MG/DL — SIGNIFICANT CHANGE UP (ref 0.5–1.3)
EGFR: 75 ML/MIN/1.73M2 — SIGNIFICANT CHANGE UP
EOSINOPHIL # BLD AUTO: 0.09 K/UL — SIGNIFICANT CHANGE UP (ref 0–0.5)
EOSINOPHIL NFR BLD AUTO: 1.5 % — SIGNIFICANT CHANGE UP (ref 0–6)
GLUCOSE SERPL-MCNC: 122 MG/DL — HIGH (ref 70–99)
HCT VFR BLD CALC: 48.1 % — SIGNIFICANT CHANGE UP (ref 39–50)
HGB BLD-MCNC: 16.6 G/DL — SIGNIFICANT CHANGE UP (ref 13–17)
IMM GRANULOCYTES NFR BLD AUTO: 0.3 % — SIGNIFICANT CHANGE UP (ref 0–0.9)
LYMPHOCYTES # BLD AUTO: 2.11 K/UL — SIGNIFICANT CHANGE UP (ref 1–3.3)
LYMPHOCYTES # BLD AUTO: 34.1 % — SIGNIFICANT CHANGE UP (ref 13–44)
MAGNESIUM SERPL-MCNC: 2.3 MG/DL — SIGNIFICANT CHANGE UP (ref 1.6–2.6)
MCHC RBC-ENTMCNC: 30.7 PG — SIGNIFICANT CHANGE UP (ref 27–34)
MCHC RBC-ENTMCNC: 34.5 GM/DL — SIGNIFICANT CHANGE UP (ref 32–36)
MCV RBC AUTO: 89.1 FL — SIGNIFICANT CHANGE UP (ref 80–100)
MONOCYTES # BLD AUTO: 0.55 K/UL — SIGNIFICANT CHANGE UP (ref 0–0.9)
MONOCYTES NFR BLD AUTO: 8.9 % — SIGNIFICANT CHANGE UP (ref 2–14)
NEUTROPHILS # BLD AUTO: 3.36 K/UL — SIGNIFICANT CHANGE UP (ref 1.8–7.4)
NEUTROPHILS NFR BLD AUTO: 54.2 % — SIGNIFICANT CHANGE UP (ref 43–77)
NRBC # BLD: 0 /100 WBCS — SIGNIFICANT CHANGE UP (ref 0–0)
PHOSPHATE SERPL-MCNC: 3.5 MG/DL — SIGNIFICANT CHANGE UP (ref 2.5–4.5)
PLATELET # BLD AUTO: 251 K/UL — SIGNIFICANT CHANGE UP (ref 150–400)
POTASSIUM SERPL-MCNC: 4.8 MMOL/L — SIGNIFICANT CHANGE UP (ref 3.5–5.3)
POTASSIUM SERPL-SCNC: 4.8 MMOL/L — SIGNIFICANT CHANGE UP (ref 3.5–5.3)
PROT SERPL-MCNC: 7.4 G/DL — SIGNIFICANT CHANGE UP (ref 6–8.3)
RBC # BLD: 5.4 M/UL — SIGNIFICANT CHANGE UP (ref 4.2–5.8)
RBC # FLD: 12.8 % — SIGNIFICANT CHANGE UP (ref 10.3–14.5)
SODIUM SERPL-SCNC: 137 MMOL/L — SIGNIFICANT CHANGE UP (ref 135–145)
TROPONIN I, HIGH SENSITIVITY RESULT: 27.6 NG/L — SIGNIFICANT CHANGE UP
WBC # BLD: 6.19 K/UL — SIGNIFICANT CHANGE UP (ref 3.8–10.5)
WBC # FLD AUTO: 6.19 K/UL — SIGNIFICANT CHANGE UP (ref 3.8–10.5)

## 2024-03-18 PROCEDURE — 83735 ASSAY OF MAGNESIUM: CPT

## 2024-03-18 PROCEDURE — 84484 ASSAY OF TROPONIN QUANT: CPT

## 2024-03-18 PROCEDURE — 99285 EMERGENCY DEPT VISIT HI MDM: CPT

## 2024-03-18 PROCEDURE — 93010 ELECTROCARDIOGRAM REPORT: CPT

## 2024-03-18 PROCEDURE — 99284 EMERGENCY DEPT VISIT MOD MDM: CPT

## 2024-03-18 PROCEDURE — 80053 COMPREHEN METABOLIC PANEL: CPT

## 2024-03-18 PROCEDURE — 84100 ASSAY OF PHOSPHORUS: CPT

## 2024-03-18 PROCEDURE — 99284 EMERGENCY DEPT VISIT MOD MDM: CPT | Mod: 25

## 2024-03-18 PROCEDURE — 71275 CT ANGIOGRAPHY CHEST: CPT | Mod: 26,MC

## 2024-03-18 PROCEDURE — 70450 CT HEAD/BRAIN W/O DYE: CPT | Mod: MC

## 2024-03-18 PROCEDURE — 99283 EMERGENCY DEPT VISIT LOW MDM: CPT | Mod: 25

## 2024-03-18 PROCEDURE — 93005 ELECTROCARDIOGRAM TRACING: CPT

## 2024-03-18 PROCEDURE — 36415 COLL VENOUS BLD VENIPUNCTURE: CPT

## 2024-03-18 PROCEDURE — 85025 COMPLETE CBC W/AUTO DIFF WBC: CPT

## 2024-03-18 PROCEDURE — 74174 CTA ABD&PLVS W/CONTRAST: CPT | Mod: 26,MC

## 2024-03-18 PROCEDURE — 82962 GLUCOSE BLOOD TEST: CPT

## 2024-03-18 PROCEDURE — 70450 CT HEAD/BRAIN W/O DYE: CPT | Mod: 26,MC

## 2024-03-18 PROCEDURE — 71275 CT ANGIOGRAPHY CHEST: CPT | Mod: MC

## 2024-03-18 PROCEDURE — 74174 CTA ABD&PLVS W/CONTRAST: CPT | Mod: MC

## 2024-03-18 NOTE — ED PROVIDER NOTE - PHYSICAL EXAMINATION
General:     NAD, well-nourished, well-appearing  Eyes: PERRL, white sclera  Head:     NC/AT, EOMI  Lungs:     CTA b/l  CVS:     RRR, occasional premature beats. No murmur or gallops.  Abd:     +BS, s/nt/nd  Ext:   no deformities   Skin: no rash  Neuro: AAOx3, no sensory/motor deficits , no pronator drift, CN 3-12 intact.

## 2024-03-18 NOTE — ED PROVIDER NOTE - OBJECTIVE STATEMENT
58-year-old male presents to the emergency department complaining of elevated blood pressure reading at home with a wrist monitor.  Patient states that he monitor and decided to check his blood pressure right foot wound.  Patient states that when he did, the systolic reading was in the 190s.  Patient rechecked it multiple times and got various readings that ranged between 170s to 200s.  Patient denies chest pain.  No headache.  No change in vision.  Patient states he has history of diabetes, hyperlipidemia.  He saw his neurologist a week ago and was told that his blood pressure was borderline elevated.  Chart reviewed, patient had blood pressure 152/80 & was advised to follow-up with his primary medical doctor.

## 2024-03-18 NOTE — ED ADULT TRIAGE NOTE - MEANS OF ARRIVAL
Pharmacy faxed in a request for prior authorization on:    Medication:Viloxazine HCL ER   Dosage: 200 MG   Quantity requested: 30/2  Pharmacy for prescription has been selected.    Prior authorization request has been initiated and sent for completion.  
ambulatory

## 2024-03-18 NOTE — ED ADULT NURSE NOTE - OBJECTIVE STATEMENT
Pt came in from home with HTN x1 week. Noted to be 200/100s on his BP monitor at home, no known history. No CP, HA, vision changes. /92 on arrival.

## 2024-03-18 NOTE — ED ADULT NURSE NOTE - NS ED NURSE LEVEL OF CONSCIOUSNESS MENTAL STATUS
Enedelia, from the [facility] stated that the patient's insurance requires a statement from  that he approves the flu shot the patient received there. Per  it's okay to send a letter stating that.    Letter faxed to [employer] @ [phone].    Awake/Alert/Cooperative

## 2024-03-18 NOTE — ED PROVIDER NOTE - CLINICAL SUMMARY MEDICAL DECISION MAKING FREE TEXT BOX
58-year-old male presents to the emergency department complaining of elevated blood pressure reading at home with a wrist monitor.  Patient states that he monitor and decided to check his blood pressure right foot wound.  Patient states that when he did, the systolic reading was in the 190s.  Patient rechecked it multiple times and got various readings that ranged between 170s to 200s.  Patient denies chest pain.  No headache.  No change in vision.  Patient states he has history of diabetes, hyperlipidemia.  He saw his neurologist a week ago and was told that his blood pressure was borderline elevated.  Chart reviewed, patient had blood pressure 152/80 & was advised to follow-up with his primary medical doctor.  Exam as stated.   EKG with PACs; /92. Stable for dc. Advise f/u with Cardiologist or Primary Doctor. Advised to keep a BP diary.   Pt stable for dc. No immediate intervention reqd. All questions answred.

## 2024-03-18 NOTE — ED PROVIDER NOTE - PATIENT PORTAL LINK FT
You can access the FollowMyHealth Patient Portal offered by [facility] by registering at the following website: [email or web address]. By joining [employer]’s FollowMyHealth portal, you will also be able to view your health information using other applications (apps) compatible with our system.

## 2024-03-18 NOTE — ED ADULT NURSE NOTE - OBJECTIVE STATEMENT
Pt aaox4, came back to ED complaining of HTN, Left leg and hand numbness. Pt was seen in ED earlier and was discharged at 1700.  Pt denies chest pain, and sob.

## 2024-03-18 NOTE — ED ADULT TRIAGE NOTE - CHIEF COMPLAINT QUOTE
Pt seen today for HTN, now with numbness and tingling to bilateral upper extremities and L lower leg. MD Morgan called to triage for stroke evaluation. No code stroke activated

## 2024-03-18 NOTE — ED PROVIDER NOTE - NSFOLLOWUPINSTRUCTIONS_ED_ALL_ED_FT
Your blood pressure reading is not of dangerous value.  We obtained 138/92.  We recommend following up with your cardiologist.  A copy of EKG is performed been provided to you.  Your EKG does show that you have a premature beat every now and again.  It is very infrequent.  This is not of major concern.  We do advise following up with your cardiologist and/or primary medical doctor and in the meantime keeping a diary of your blood pressure readings once a day.  This way, when you follow-up with your doctor, they can make a decision on whether or not medication needs to be initiated.    Information:   Hypertension, commonly called high blood pressure, is when the force of blood pumping through your arteries is too strong. Hypertension forces your heart to work harder to pump blood. Your arteries may become narrow or stiff. Having untreated or uncontrolled hypertension for a long period of time can cause heart attack, stroke, kidney disease, and other problems. Maintain a healthy lifestyle and follow up with your primary care physician.    SEEK IMMEDIATE MEDICAL CARE IF YOU HAVE ANY OF THE FOLLOWING SYMPTOMS: severe headache, confusion, chest pain, abdominal pain, vomiting, or shortness of breath.

## 2024-03-19 VITALS
OXYGEN SATURATION: 96 % | HEART RATE: 72 BPM | RESPIRATION RATE: 18 BRPM | DIASTOLIC BLOOD PRESSURE: 78 MMHG | SYSTOLIC BLOOD PRESSURE: 121 MMHG

## 2024-03-19 RX ORDER — AMLODIPINE BESYLATE 2.5 MG/1
1 TABLET ORAL
Qty: 30 | Refills: 0
Start: 2024-03-19 | End: 2024-04-17

## 2024-03-19 NOTE — ED PROVIDER NOTE - PATIENT PORTAL LINK FT
You can access the FollowMyHealth Patient Portal offered by [facility] by registering at the following website: [email or web address]. By joining [employer]’s FollowMyHealth portal, you will also be able to view your health information using other applications (apps) compatible with our system.

## 2024-03-19 NOTE — ED PROVIDER NOTE - CLINICAL SUMMARY MEDICAL DECISION MAKING FREE TEXT BOX
Patient with episode of numbness to the fingers as well as the left foot of uncertain etiology.  Possibly related to anxiety about blood pressure.  ED workup is negative.  No etiology has been elucidated.  Patient is stable for outpatient follow-up.

## 2024-03-19 NOTE — ED PROVIDER NOTE - NSFOLLOWUPINSTRUCTIONS_ED_ALL_ED_FT
-- You should update your primary care physician on your Emergency Department visit and follow up with them.  If you do not have a physician or have difficulty following up, please call: [phone] to obtain a [facility] doctor or specialist who can provide follow up.    -- Return to the ER for worsening or persistent symptoms, and/or ANY NEW OR CONCERNING SYMPTOMS.

## 2024-03-19 NOTE — ED PROVIDER NOTE - OBJECTIVE STATEMENT
Patient was here earlier today because of high blood pressure episode at home but patient had a normal blood pressure in the ER so was discharged with instructions to monitor his blood pressure and follow-up with his doctor.  Today after discharge patient started to have numbness to the left fingertips as well as his left foot and was unclear what was happening.  Patient returns to the ER for further evaluation.  Patient states that he worked out today and afterward felt fine but over the last few days his blood pressure has been very erratic with very high spikes into the 190s.

## 2024-05-10 NOTE — ED ADULT NURSE NOTE - SUICIDE SCREENING QUESTION 3
No Patient complaining of abdominal pain with associated nausea, vomiting and diarrhea x1day. LMP 04/28.

## 2024-11-01 NOTE — ED PROVIDER NOTE - NSICDXPASTMEDICALHX_GEN_ALL_CORE_FT
New Patient Office Visit      Patient Name: Mavis Byrd  : 1986   MRN: [id number]     Referring Physician: Vanessa Galeana APRN    Chief Complaint:    Chief Complaint   Patient presents with    Establish Care     Neuropathy; pt states both arms have been going numb at night and when driving. This has been occurring for months.        History of Present Illness: Mavis Byrd is a 38 y.o. female who is here today to establish care with Neurology.  She has never been seen before neurology.  She was referred to us from PCP (Kervin) for bilateral upper extremity neuropathy.     She co BUE peripheral neuropathy that has been present for years intermittently and has become constant x 4-5 months. R>L. Right hand dominate. Worse at night. She co dull throbbing ache and has to shake her hands awake or dangle her arms to make the sensation come back. 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 Has tried to wear carpal tunnel braces and this will help mildly. Wonders if this is a SE of her Suboxone. Pain is moderate to severe and makes her cry at night. Does suffer from chronic neck pain and often using hot rags around her neck and assumed arthritis. Previously tried and failed Amitriptyline.     Social:  Single. 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 In [facility] until . Lives with brother and her son (Zev, age 18). 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 No tobacco. Vaping nicotine. No recreational drugs or cannabis or ETOH. No  service.     [facility] Neuro: Mother- Seizures, Maternal Grandmother- Dementia     Recent Imaging: NONE    Pertinent Medical History: Hepatitis C, substance abuse    Subjective      Review of Systems:   Review of Systems   Constitutional: Negative.    HENT: Negative.     Eyes: Negative.    Respiratory: Negative.     Cardiovascular: Negative.    Gastrointestinal: Negative.    Endocrine: Negative.   "  Genitourinary: Negative.    Musculoskeletal:  Positive for neck pain.   Skin: Negative.    Allergic/Immunologic: Negative.    Neurological:  Positive for numbness.   Hematological: Negative.    Psychiatric/Behavioral: Negative.     All other systems reviewed and are negative.      Past Medical History:   Past Medical History:   Diagnosis Date    Hepatitis C        Past Surgical History: History reviewed. No pertinent surgical history.    Family History:   Family History   Problem Relation Age of Onset    Seizures Mother     Dementia Maternal Grandmother        Social History:   Social History     Socioeconomic History    Marital status: Single   Tobacco Use    Smoking status: Former     Current packs/day: 0.00     Types: Cigarettes     Quit date: 2024     Years since quittin.6   Substance and Sexual Activity    Alcohol use: Not Currently    Drug use: Not Currently     Comment: narcotics- sober for 10 months    Sexual activity: Defer       Medications:     Current Outpatient Medications:     buprenorphine-naloxone (SUBOXONE) 8-2 MG per SL tablet, Place 1 tablet under the tongue Daily., Disp: , Rfl:     Allergies:   No Known Allergies    Objective     Physical Exam:  Vital Signs:   Vitals:    24 1344   BP: 126/78   BP Location: Right arm   Patient Position: Sitting   Cuff Size: Adult   Pulse: 74   Resp: 14   Temp: 98.2 °F (36.8 °C)   TempSrc: Infrared   SpO2: 99%   Weight: 56 kg (123 lb 6.4 oz)   Height: 125.3 cm (49.33\")   PainSc: 0-No pain     Body mass index is 35.65 kg/m².     Physical Exam  Vitals and nursing note reviewed.   Constitutional:       General: She is not in acute distress.     Appearance: Normal appearance.   HENT:      Head: Normocephalic.      Nose: Nose normal.      Mouth/Throat:      Mouth: Mucous membranes are moist.      Pharynx: Oropharynx is clear.   Eyes:      General: Lids are normal.      Extraocular Movements: Extraocular movements intact.      Conjunctiva/sclera: " Conjunctivae normal.      Pupils: Pupils are equal, round, and reactive to light.   Musculoskeletal:      Cervical back: Normal range of motion and neck supple.      Comments: + bilateral Phalen  + R Tinel - L Tinel    Skin:     General: Skin is warm and dry.      Capillary Refill: Capillary refill takes less than 2 seconds.   Neurological:      Mental Status: She is alert and oriented to person, place, and time.      Sensory: Sensory deficit present.      Motor: Motor strength is normal.     Coordination: Coordination is intact.      Deep Tendon Reflexes:      Reflex Scores:       Tricep reflexes are 2+ on the right side and 2+ on the left side.       Bicep reflexes are 2+ on the right side and 2+ on the left side.       Patellar reflexes are 2+ on the right side and 2+ on the left side.  Psychiatric:         Mood and Affect: Mood normal.         Speech: Speech normal.         Behavior: Behavior normal.         Neurological Exam  Mental Status  Alert. Oriented to person, place and time. Oriented to person, place, and time. Speech is normal. Fund of knowledge is appropriate for level of education.    Cranial Nerves  CN II: Visual acuity is normal. Visual fields full to confrontation.  CN III, IV, VI: Extraocular movements intact bilaterally. Normal lids and orbits bilaterally. Pupils equal round and reactive to light bilaterally.  CN V: Facial sensation is normal.  CN VII: Full and symmetric facial movement.  CN IX, X: Palate elevates symmetrically. Normal gag reflex.  CN XI: Shoulder shrug strength is normal.  CN XII: Tongue midline without atrophy or fasciculations.    Motor  Normal muscle bulk throughout. No fasciculations present. Normal muscle tone. No abnormal involuntary movements. Strength is 5/5 throughout all four extremities.    Sensory  Light touch is normal in upper and lower extremities.   Right: Loss of sensation in the median nerve distribution.  Left: Loss of sensation in the median nerve  distribution.    Reflexes                                            Right                      Left  Biceps                                 2+                         2+  Triceps                                2+                         2+  Patellar                                2+                         2+    Right pathological reflexes: Lluvia's absent.  Left pathological reflexes: Lluvia's absent.    Coordination    Finger-to-nose, rapid alternating movements and heel-to-shin normal bilaterally without dysmetria.    Gait  Normal casual, toe, heel and tandem gait.      PHQ-9 Total Score:       Assessment / Plan      Assessment/Plan:   Diagnoses and all orders for this visit:    1. Paresthesia and pain of both upper extremities (Primary)  -     EMG & Nerve Conduction Test; Future  -     Vitamin B12; Future  -     Folate; Future  -     Methylmalonic Acid, Serum; Future  -     Antinuclear Antibodies (MARTIN) Direct; Future  -     TSH; Future  -     Hemoglobin A1c; Future  -     Vitamin D 25 Hydroxy; Future  -     Urine Drug Screen - Urine, Clean Catch; Future         Follow Up:   Return in about 3 months (around 2/1/2025), or if symptoms worsen or fail to improve.    Anticipatory guidance and safety reviewed  Patient education provided  EMG NCS bilateral upper extremities  Labs: UDS, Vitamin B12, folate, MMA, MARTIN, TSH, hemoglobin A1c and vitamin D  [facility] # [id number] reviewed and appropriate  CDA- obtained  Begin Gabapentin 300 mg QHS #30 with x 2 refills; SE reviewed.  Encouraged continued use of bilateral upper extremity carpal tunnel support braces  [place] [address] patient encounter form completed    RTC PRN or within 12 weeks or sooner with issues       EDUARDO Jaeger  [facility]    PAST MEDICAL HISTORY:  Borderline diabetes     Hypothyroid

## 2024-11-20 ENCOUNTER — NON-APPOINTMENT (OUTPATIENT)
Age: 59
End: 2024-11-20

## 2025-05-12 ENCOUNTER — NON-APPOINTMENT (OUTPATIENT)
Age: 60
End: 2025-05-12